# Patient Record
Sex: MALE | Race: OTHER | HISPANIC OR LATINO
[De-identification: names, ages, dates, MRNs, and addresses within clinical notes are randomized per-mention and may not be internally consistent; named-entity substitution may affect disease eponyms.]

---

## 2018-12-03 PROBLEM — Z00.00 ENCOUNTER FOR PREVENTIVE HEALTH EXAMINATION: Status: ACTIVE | Noted: 2018-12-03

## 2018-12-04 ENCOUNTER — APPOINTMENT (OUTPATIENT)
Dept: RADIOLOGY | Facility: CLINIC | Age: 63
End: 2018-12-04
Payer: SELF-PAY

## 2018-12-04 ENCOUNTER — OUTPATIENT (OUTPATIENT)
Dept: OUTPATIENT SERVICES | Facility: HOSPITAL | Age: 63
LOS: 1 days | End: 2018-12-04
Payer: MEDICARE

## 2018-12-04 DIAGNOSIS — R05 COUGH: ICD-10-CM

## 2018-12-04 PROCEDURE — 71046 X-RAY EXAM CHEST 2 VIEWS: CPT | Mod: 26

## 2018-12-04 PROCEDURE — 71046 X-RAY EXAM CHEST 2 VIEWS: CPT

## 2021-07-23 ENCOUNTER — OUTPATIENT (OUTPATIENT)
Dept: OUTPATIENT SERVICES | Facility: HOSPITAL | Age: 66
LOS: 1 days | End: 2021-07-23
Payer: MEDICARE

## 2021-07-23 ENCOUNTER — APPOINTMENT (OUTPATIENT)
Dept: ULTRASOUND IMAGING | Facility: CLINIC | Age: 66
End: 2021-07-23
Payer: MEDICARE

## 2021-07-23 DIAGNOSIS — Z00.8 ENCOUNTER FOR OTHER GENERAL EXAMINATION: ICD-10-CM

## 2021-07-23 PROCEDURE — 76775 US EXAM ABDO BACK WALL LIM: CPT

## 2021-07-23 PROCEDURE — 76775 US EXAM ABDO BACK WALL LIM: CPT | Mod: 26

## 2021-08-03 ENCOUNTER — APPOINTMENT (OUTPATIENT)
Dept: OTOLARYNGOLOGY | Facility: CLINIC | Age: 66
End: 2021-08-03
Payer: MEDICARE

## 2021-08-03 VITALS
SYSTOLIC BLOOD PRESSURE: 134 MMHG | BODY MASS INDEX: 24.48 KG/M2 | DIASTOLIC BLOOD PRESSURE: 86 MMHG | HEART RATE: 80 BPM | TEMPERATURE: 97.4 F | HEIGHT: 70 IN | WEIGHT: 171 LBS

## 2021-08-03 PROCEDURE — 99204 OFFICE O/P NEW MOD 45 MIN: CPT | Mod: 25

## 2021-08-03 PROCEDURE — 31231 NASAL ENDOSCOPY DX: CPT

## 2021-08-03 NOTE — HISTORY OF PRESENT ILLNESS
[de-identified] : Patient complains loss of taste and smell about a year ago, he saw  had sinus surgery 12/2020 and stents placed in his sinuses. He has hx of recurrent sinus infections, after surgery breathing improved and sinus infections resolved. He continues to have loss of taste and smell after surgery. He was treated with steroids and nasal sprays and it has not improved his sense of taste and smell. Pt has no ear pain, ear drainage, hearing loss, tinnitus, vertigo, epistaxis, throat pain, dysphagia or fevers\par \par

## 2021-08-03 NOTE — PROCEDURE
[FreeTextEntry6] : Nasal Endoscopy (93335)\par \par After informed verbal consent, nasal endoscopy was performed due to anterior rhinoscopy insufficient to account for symptoms. Flexible  scope #23 was used.  Topical vasoconstrictor and anesthetic was used.  \par \par The nasal endoscope is passed via the right nasal cavity. The inferior, middle, and superior turbinates responded to vasoconstrictors. The inferior, middle and superior meati were examined. The osteomeatal complex is clear with no polyposis or purulence. The sphenoethmoidal recess is clear with no polyposis or purulence. The choana is patent. \par \par The nasal endoscope is passed via the left nasal cavity. The inferior, middle, and superior turbinates responded to vasoconstrictors. The inferior, middle and superior meati were examined. The osteomeatal complex is clear with no polyposis or purulence. The sphenoethmoidal recess is clear with no polyposis or purulence. The choana is patent.  \par \par \par

## 2021-08-03 NOTE — ASSESSMENT
[FreeTextEntry1] : Patient status post sinus surgery had decreased sense of smell and taste prior surgery CAT scan was seen massive pansinusitis surgery improvement dramatically with a sense of smell and taste has not come back yet is here for second opinion CAT scan was reviewed showed that there is no evidence of any of frontal lobe tumors or masses endoscopically the sinuses that are visualized look clean I recommended that he try NeilMed sinus rinse with desonide as well as Flonase nasal spray after a course of a Medrol Dosepak and see if this will help him I also recommended he may want to follow-up with his ENT who did the surgery to repeat a CAT scan to see if there is any remnants of disease in certain areas that may need to be treated more aggressively.

## 2021-08-03 NOTE — END OF VISIT
[FreeTextEntry3] : I personally saw and examined [ ] in detail. I have spoken to Luli MAURICE regarding the assessment and plan of care. I reviewed the above assessment and plan of care, and agree. I have made changes in the body of the note where appropriate.\par

## 2023-01-25 DIAGNOSIS — I48.91 UNSPECIFIED ATRIAL FIBRILLATION: ICD-10-CM

## 2023-01-25 DIAGNOSIS — R43.0 ANOSMIA: ICD-10-CM

## 2023-01-25 DIAGNOSIS — Z87.891 PERSONAL HISTORY OF NICOTINE DEPENDENCE: ICD-10-CM

## 2023-01-25 DIAGNOSIS — R31.29 OTHER MICROSCOPIC HEMATURIA: ICD-10-CM

## 2023-01-25 DIAGNOSIS — Z82.49 FAMILY HISTORY OF ISCHEMIC HEART DISEASE AND OTHER DISEASES OF THE CIRCULATORY SYSTEM: ICD-10-CM

## 2023-01-25 DIAGNOSIS — Z78.9 OTHER SPECIFIED HEALTH STATUS: ICD-10-CM

## 2023-01-25 DIAGNOSIS — Z80.3 FAMILY HISTORY OF MALIGNANT NEOPLASM OF BREAST: ICD-10-CM

## 2023-01-25 DIAGNOSIS — J45.909 UNSPECIFIED ASTHMA, UNCOMPLICATED: ICD-10-CM

## 2023-01-25 RX ORDER — CHLORHEXIDINE GLUCONATE 4 %
1000 LIQUID (ML) TOPICAL DAILY
Refills: 0 | Status: ACTIVE | COMMUNITY
Start: 2023-01-25

## 2023-01-25 RX ORDER — METOPROLOL SUCCINATE 25 MG/1
25 TABLET, EXTENDED RELEASE ORAL
Qty: 30 | Refills: 0 | Status: ACTIVE | COMMUNITY
Start: 2023-01-25

## 2023-01-25 RX ORDER — COLD-HOT PACK
125 MCG EACH MISCELLANEOUS
Refills: 0 | Status: ACTIVE | COMMUNITY
Start: 2023-01-25

## 2023-01-25 RX ORDER — ALPRAZOLAM 0.25 MG/1
0.25 TABLET ORAL 3 TIMES DAILY
Refills: 0 | Status: ACTIVE | COMMUNITY
Start: 2023-01-25

## 2023-01-25 RX ORDER — FLUTICASONE PROPIONATE 50 UG/1
50 SPRAY, METERED NASAL
Qty: 1 | Refills: 5 | Status: DISCONTINUED | COMMUNITY
Start: 2021-08-03 | End: 2023-01-25

## 2023-01-25 RX ORDER — ALBUTEROL SULFATE 2.5 MG/3ML
(2.5 MG/3ML) SOLUTION RESPIRATORY (INHALATION)
Refills: 0 | Status: ACTIVE | COMMUNITY
Start: 2023-01-25

## 2023-01-25 RX ORDER — BUDESONIDE 1 MG/2ML
1 INHALANT ORAL
Qty: 1 | Refills: 5 | Status: DISCONTINUED | COMMUNITY
Start: 2021-08-03 | End: 2023-01-25

## 2023-01-25 RX ORDER — METHYLPREDNISOLONE 4 MG/1
4 TABLET ORAL
Qty: 1 | Refills: 0 | Status: DISCONTINUED | COMMUNITY
Start: 2021-08-03 | End: 2023-01-25

## 2023-01-25 RX ORDER — ALBUTEROL SULFATE 4 MG/1
TABLET ORAL
Refills: 0 | Status: DISCONTINUED | COMMUNITY
End: 2023-01-25

## 2023-01-25 RX ORDER — ZOLPIDEM TARTRATE 5 MG/1
5 TABLET, FILM COATED ORAL
Refills: 0 | Status: ACTIVE | COMMUNITY

## 2023-01-25 RX ORDER — DOXYCYCLINE HYCLATE 20 MG/1
20 TABLET ORAL
Refills: 0 | Status: DISCONTINUED | COMMUNITY
End: 2023-01-25

## 2023-01-25 RX ORDER — ASPIRIN 81 MG
81 TABLET, DELAYED RELEASE (ENTERIC COATED) ORAL
Refills: 0 | Status: DISCONTINUED | COMMUNITY
End: 2023-01-25

## 2023-01-25 RX ORDER — RIVAROXABAN 20 MG/1
20 TABLET, FILM COATED ORAL
Qty: 30 | Refills: 0 | Status: ACTIVE | COMMUNITY
Start: 2023-01-25

## 2023-01-26 ENCOUNTER — NON-APPOINTMENT (OUTPATIENT)
Age: 68
End: 2023-01-26

## 2023-01-26 ENCOUNTER — APPOINTMENT (OUTPATIENT)
Dept: ELECTROPHYSIOLOGY | Facility: CLINIC | Age: 68
End: 2023-01-26
Payer: MEDICARE

## 2023-01-26 VITALS
HEART RATE: 80 BPM | DIASTOLIC BLOOD PRESSURE: 70 MMHG | SYSTOLIC BLOOD PRESSURE: 122 MMHG | WEIGHT: 175 LBS | TEMPERATURE: 98.1 F | OXYGEN SATURATION: 96 % | HEIGHT: 70 IN | BODY MASS INDEX: 25.05 KG/M2

## 2023-01-26 PROCEDURE — 93000 ELECTROCARDIOGRAM COMPLETE: CPT

## 2023-01-26 PROCEDURE — 99205 OFFICE O/P NEW HI 60 MIN: CPT | Mod: 25

## 2023-01-26 RX ORDER — ASPIRIN ENTERIC COATED TABLETS 81 MG 81 MG/1
81 TABLET, DELAYED RELEASE ORAL
Qty: 90 | Refills: 3 | Status: DISCONTINUED | COMMUNITY
Start: 2023-01-25 | End: 2023-01-26

## 2023-01-26 NOTE — PHYSICAL EXAM
[Well Developed] : well developed [Well Nourished] : well nourished [No Acute Distress] : no acute distress [Normal Conjunctiva] : normal conjunctiva [Normal Venous Pressure] : normal venous pressure [Normal S1, S2] : normal S1, S2 [No Murmur] : no murmur [Clear Lung Fields] : clear lung fields [Good Air Entry] : good air entry [Soft] : abdomen soft [Normal Gait] : normal gait [No Edema] : no edema [No Cyanosis] : no cyanosis [No Clubbing] : no clubbing [No Rash] : no rash [Moves all extremities] : moves all extremities [No Focal Deficits] : no focal deficits [Alert and Oriented] : alert and oriented

## 2023-01-26 NOTE — REVIEW OF SYSTEMS
[SOB] : no shortness of breath [Dyspnea on exertion] : not dyspnea during exertion [Chest Discomfort] : no chest discomfort [Lower Ext Edema] : no extremity edema [Leg Claudication] : no intermittent leg claudication [Palpitations] : no palpitations [Syncope] : no syncope [Rash] : no rash [Dizziness] : no dizziness [Convulsions] : no convulsions [Easy Bleeding] : no tendency for easy bleeding [Negative] : Musculoskeletal

## 2023-01-26 NOTE — REASON FOR VISIT
[Arrhythmia/ECG Abnorrmalities] : arrhythmia/ECG abnormalities [Spouse] : spouse [FreeTextEntry3] : Dr Schwarz

## 2023-01-26 NOTE — HISTORY OF PRESENT ILLNESS
[FreeTextEntry1] : 67 year old gentleman with history of asthma, former smoker, presenting for evaluation paroxysmal atrial fibrillation.  \par \par He recently noted on his Fitbit watch to have suspected AF, but had no associated symptoms. His fit bit alerted him to multiple episodes of recurrent AF with rates  bpm, lasting up to an hour or so.  \par \par In office ECG 12/29/22 revealed sinus rhythm at 97 bpm. He then wore an event monitor from 12/30/22 to 1/13/23 and had an episode of atrial fibrillation lasting 5.5 hours with rates in AF  bpm (avg 116 bpm), and overall AF burden 2%. Otherwise in sinus rhythm his AVG HR was 72 bpm (range  bpm).  \par \par He was started on Xarelto 20mg qd and Toprol 25mg qd. He is tolerating medical therapy and denies bleeding issues.  \par \par He denies any palpitation, chest pain, dyspnea or syncope. He is able to walk frequently and go bowling without limitations.  \par \par He does drink beer particularly on weekends. He has poor sleep but denies known sleep apnea.  \par \par He denies history of HTN, CHF, DM, CAD, stroke.  \par \par He rosales shave a family history of aortic valve disease including his mother with aortic aneursym and two syblings with aortic disection. He has recently undergone workup with abdominal ultrasound.,  \par \par Recent stress test 1/24/23 revealed no ischemia or exercise induced arrhythmia. HR increased from 74 to 163 bpm (8METS).  \par \par A TTE is pending.  \par

## 2023-01-26 NOTE — CARDIOLOGY SUMMARY
[de-identified] : 1/26/23 sinus rhythm 67 bpm, narrow QRS [de-identified] : ETT 1/24/23 revealed no ischemia or exercise induced arrhythmia. HR increased from 74 to 163 bpm (8METS).   \par \par ETT 4/6/2016 negative for ischemia or induced arrhythmia. HR 7-0- 162 bpm with exercise (102% predicted)  [de-identified] : Carotid 7/21 no significant carotid stenosis

## 2023-01-26 NOTE — DISCUSSION/SUMMARY
[FreeTextEntry1] : 67 year old gentleman with history of asthma, former smoker, presenting for evaluation paroxysmal atrial fibrillation. He has had episodes of paroxysmal atrial fibrillation with minimal associated symptoms at this point. Recent Event monitor noted a 2% AF burden with episodes lasting over 5 hours with rapid rates. We discussed AF in detail, associated risks including risk of progression to persistent AF and risk of CHF and stroke. We reviewed management options, and typical indications for rhythm control including symptoms. Though he is not highly symptomatic, he is concerned about progression of this arrhythmia and expressed interest in an early rhythm control strategy to reduce risk of progression and long-term AF-related morbidity; we did discuss recent evidence that supports an early rhythm control strategy for this purpose. He is hopeful to avoid additional long-term medical therapy, and would like to proceed with AF ablation. We discussed the risks and benefits of AF ablation in detail, including procedure related risks such as bleeding, vascular injury, cardiac perforation, stroke and esophageal injury. He expressed understanding, and does want to proceed later this year.  \par \par -AF ablation. Possible ORESTES if interruption in anticoagulation.  \par \par -continue Xarelto through day prior to the procedure.  \par \par -CHADSVASc=1, will continue anticoagulation se-procedurally, and reevaluate long-term risks and benefits of anticoagulation pending followup after ablation.  \par \par -continue metoprolol for now as tolerated.  \par \par -continue Fitbit for arrhythmia monitoring at this time.   [EKG obtained to assist in diagnosis and management of assessed problem(s)] : EKG obtained to assist in diagnosis and management of assessed problem(s)

## 2023-03-22 ENCOUNTER — APPOINTMENT (OUTPATIENT)
Dept: CT IMAGING | Facility: CLINIC | Age: 68
End: 2023-03-22
Payer: MEDICARE

## 2023-03-22 PROCEDURE — 74177 CT ABD & PELVIS W/CONTRAST: CPT

## 2023-05-02 ENCOUNTER — OUTPATIENT (OUTPATIENT)
Dept: OUTPATIENT SERVICES | Facility: HOSPITAL | Age: 68
LOS: 1 days | End: 2023-05-02
Payer: MEDICARE

## 2023-05-02 VITALS
HEART RATE: 88 BPM | HEIGHT: 70 IN | TEMPERATURE: 98 F | OXYGEN SATURATION: 98 % | SYSTOLIC BLOOD PRESSURE: 100 MMHG | WEIGHT: 174.39 LBS | RESPIRATION RATE: 16 BRPM | DIASTOLIC BLOOD PRESSURE: 78 MMHG

## 2023-05-02 DIAGNOSIS — Z98.890 OTHER SPECIFIED POSTPROCEDURAL STATES: Chronic | ICD-10-CM

## 2023-05-02 DIAGNOSIS — I10 ESSENTIAL (PRIMARY) HYPERTENSION: ICD-10-CM

## 2023-05-02 DIAGNOSIS — Z87.09 PERSONAL HISTORY OF OTHER DISEASES OF THE RESPIRATORY SYSTEM: ICD-10-CM

## 2023-05-02 DIAGNOSIS — Z01.818 ENCOUNTER FOR OTHER PREPROCEDURAL EXAMINATION: ICD-10-CM

## 2023-05-02 DIAGNOSIS — I48.0 PAROXYSMAL ATRIAL FIBRILLATION: ICD-10-CM

## 2023-05-02 DIAGNOSIS — Z29.9 ENCOUNTER FOR PROPHYLACTIC MEASURES, UNSPECIFIED: ICD-10-CM

## 2023-05-02 DIAGNOSIS — I48.91 UNSPECIFIED ATRIAL FIBRILLATION: ICD-10-CM

## 2023-05-02 LAB
A1C WITH ESTIMATED AVERAGE GLUCOSE RESULT: 5.2 % — SIGNIFICANT CHANGE UP (ref 4–5.6)
ALBUMIN SERPL ELPH-MCNC: 4.3 G/DL — SIGNIFICANT CHANGE UP (ref 3.3–5.2)
ALP SERPL-CCNC: 86 U/L — SIGNIFICANT CHANGE UP (ref 40–120)
ALT FLD-CCNC: 15 U/L — SIGNIFICANT CHANGE UP
ANION GAP SERPL CALC-SCNC: 12 MMOL/L — SIGNIFICANT CHANGE UP (ref 5–17)
APTT BLD: 33.5 SEC — SIGNIFICANT CHANGE UP (ref 27.5–35.5)
AST SERPL-CCNC: 21 U/L — SIGNIFICANT CHANGE UP
BASOPHILS # BLD AUTO: 0.05 K/UL — SIGNIFICANT CHANGE UP (ref 0–0.2)
BASOPHILS NFR BLD AUTO: 0.9 % — SIGNIFICANT CHANGE UP (ref 0–2)
BILIRUB SERPL-MCNC: 0.4 MG/DL — SIGNIFICANT CHANGE UP (ref 0.4–2)
BLD GP AB SCN SERPL QL: SIGNIFICANT CHANGE UP
BUN SERPL-MCNC: 15.1 MG/DL — SIGNIFICANT CHANGE UP (ref 8–20)
CALCIUM SERPL-MCNC: 9.4 MG/DL — SIGNIFICANT CHANGE UP (ref 8.4–10.5)
CHLORIDE SERPL-SCNC: 103 MMOL/L — SIGNIFICANT CHANGE UP (ref 96–108)
CO2 SERPL-SCNC: 28 MMOL/L — SIGNIFICANT CHANGE UP (ref 22–29)
CREAT SERPL-MCNC: 0.75 MG/DL — SIGNIFICANT CHANGE UP (ref 0.5–1.3)
EGFR: 97 ML/MIN/1.73M2 — SIGNIFICANT CHANGE UP
EOSINOPHIL # BLD AUTO: 0.36 K/UL — SIGNIFICANT CHANGE UP (ref 0–0.5)
EOSINOPHIL NFR BLD AUTO: 6.4 % — HIGH (ref 0–6)
ESTIMATED AVERAGE GLUCOSE: 103 MG/DL — SIGNIFICANT CHANGE UP (ref 68–114)
GLUCOSE SERPL-MCNC: 96 MG/DL — SIGNIFICANT CHANGE UP (ref 70–99)
HCT VFR BLD CALC: 42.8 % — SIGNIFICANT CHANGE UP (ref 39–50)
HGB BLD-MCNC: 14.2 G/DL — SIGNIFICANT CHANGE UP (ref 13–17)
IMM GRANULOCYTES NFR BLD AUTO: 0.4 % — SIGNIFICANT CHANGE UP (ref 0–0.9)
INR BLD: 1.21 RATIO — HIGH (ref 0.88–1.16)
LYMPHOCYTES # BLD AUTO: 1.33 K/UL — SIGNIFICANT CHANGE UP (ref 1–3.3)
LYMPHOCYTES # BLD AUTO: 23.6 % — SIGNIFICANT CHANGE UP (ref 13–44)
MAGNESIUM SERPL-MCNC: 2 MG/DL — SIGNIFICANT CHANGE UP (ref 1.6–2.6)
MCHC RBC-ENTMCNC: 31.1 PG — SIGNIFICANT CHANGE UP (ref 27–34)
MCHC RBC-ENTMCNC: 33.2 GM/DL — SIGNIFICANT CHANGE UP (ref 32–36)
MCV RBC AUTO: 93.7 FL — SIGNIFICANT CHANGE UP (ref 80–100)
MONOCYTES # BLD AUTO: 0.55 K/UL — SIGNIFICANT CHANGE UP (ref 0–0.9)
MONOCYTES NFR BLD AUTO: 9.8 % — SIGNIFICANT CHANGE UP (ref 2–14)
NEUTROPHILS # BLD AUTO: 3.32 K/UL — SIGNIFICANT CHANGE UP (ref 1.8–7.4)
NEUTROPHILS NFR BLD AUTO: 58.9 % — SIGNIFICANT CHANGE UP (ref 43–77)
PLATELET # BLD AUTO: 283 K/UL — SIGNIFICANT CHANGE UP (ref 150–400)
POTASSIUM SERPL-MCNC: 4.3 MMOL/L — SIGNIFICANT CHANGE UP (ref 3.5–5.3)
POTASSIUM SERPL-SCNC: 4.3 MMOL/L — SIGNIFICANT CHANGE UP (ref 3.5–5.3)
PROT SERPL-MCNC: 7.7 G/DL — SIGNIFICANT CHANGE UP (ref 6.6–8.7)
PROTHROM AB SERPL-ACNC: 14.1 SEC — HIGH (ref 10.5–13.4)
RBC # BLD: 4.57 M/UL — SIGNIFICANT CHANGE UP (ref 4.2–5.8)
RBC # FLD: 13 % — SIGNIFICANT CHANGE UP (ref 10.3–14.5)
SODIUM SERPL-SCNC: 142 MMOL/L — SIGNIFICANT CHANGE UP (ref 135–145)
WBC # BLD: 5.63 K/UL — SIGNIFICANT CHANGE UP (ref 3.8–10.5)
WBC # FLD AUTO: 5.63 K/UL — SIGNIFICANT CHANGE UP (ref 3.8–10.5)

## 2023-05-02 PROCEDURE — 93010 ELECTROCARDIOGRAM REPORT: CPT

## 2023-05-02 PROCEDURE — G0463: CPT

## 2023-05-02 PROCEDURE — 93005 ELECTROCARDIOGRAM TRACING: CPT

## 2023-05-02 NOTE — H&P PST ADULT - PROBLEM SELECTOR PLAN 2
Total Score [        ]    Caprini Score 0-2: Low Risk, NO VTE prophylaxis required for most patients, encourage ambulation  Caprini Score 3-6: Moderate Risk , pharmacologic VTE prophylaxis is indicated for most patients (in the absence of contraindications)  Caprini Score Greater than or =7: High risk, pharmocologic VTE prophylaxis indicated for most patients (in the absence of contraindications) May continue albuterol as needed  Aware to bring inhaler day of procedure  Written & verbal instructions provided to pt for all education/instructions, questions encouraged/addressed, pt verbalized understandings of all education/instructions, teach back method utilized

## 2023-05-02 NOTE — H&P PST ADULT - IF HCP IS NOT ON CHART, IDENTIFY THE PERSONS NAME AND PHONE NUMBER CONTACTED TO BRING IN DOCUMENT
does not have, educated can copy or bring original for DOS, receptive, aware can fill out new one also

## 2023-05-02 NOTE — H&P PST ADULT - PROBLEM SELECTOR PLAN 1
EKG, labs performed, results pending  Scheduled for ORESTES / Afib Ablation / Carto / Anes on 5.9.2023 with Dr. Shaver.   Pt instructed to continue Xarelto through day prior to procedure: LAST DOSE 5.8.2023 - verbal/written instructions provided, aware no A/C day of procedure  Patient educated on surgical scrub, COVID testing, preadmission instructions, medical clearance and day of procedure medications, verbalizes understanding.   Pt instructed to stop vitamins/supplements/herbal medications/ASA/NSAIDS for one week prior to surgery and discuss with PMD.   Written & verbal instructions provided to pt for all education/instructions, questions encouraged/addressed, pt verbalized understandings of all education/instructions, teach back method utilized

## 2023-05-02 NOTE — H&P PST ADULT - NEGATIVE OPHTHALMOLOGIC SYMPTOMS
wears glasses, denies contact use/no diplopia/no photophobia/no lacrimation L/no lacrimation R/no blurred vision L/no blurred vision R/no discharge L/no discharge R/no pain L/no pain R/no irritation L

## 2023-05-02 NOTE — H&P PST ADULT - ASSESSMENT
CAPRINI SCORE    AGE RELATED RISK FACTORS                                                             [ ] Age 41-60 years                                            (1 Point)  [ ] Age: 61-74 years                                           (2 Points)                 [ ] Age= 75 years                                                (3 Points)             DISEASE RELATED RISK FACTORS                                                       [ ] Edema in the lower extremities                 (1 Point)                     [ ] Varicose veins                                               (1 Point)                                 [ ] BMI > 25 Kg/m2                                            (1 Point)                                  [ ] Serious infection (ie PNA)                            (1 Point)                     [ ] Lung disease ( COPD, Emphysema)            (1 Point)                                                                          [ ] Acute myocardial infarction                         (1 Point)                  [ ] Congestive heart failure (in the previous month)  (1 Point)         [ ] Inflammatory bowel disease                            (1 Point)                  [ ] Central venous access, PICC or Port               (2 points)       (within the last month)                                                                [ ] Stroke (in the previous month)                        (5 Points)    [ ] Previous or present malignancy                       (2 points)                                                                                                                                                         HEMATOLOGY RELATED FACTORS                                                         [ ] Prior episodes of VTE                                     (3 Points)                     [ ] Positive family history for VTE                      (3 Points)                  [ ] Prothrombin 62280 A                                     (3 Points)                     [ ] Factor V Leiden                                                (3 Points)                        [ ] Lupus anticoagulants                                      (3 Points)                                                           [ ] Anticardiolipin antibodies                              (3 Points)                                                       [ ] High homocysteine in the blood                   (3 Points)                                             [ ] Other congenital or acquired thrombophilia      (3 Points)                                                [ ] Heparin induced thrombocytopenia                  (3 Points)                                        MOBILITY RELATED FACTORS  [ ] Bed rest                                                         (1 Point)  [ ] Plaster cast                                                    (2 points)  [ ] Bed bound for more than 72 hours           (2 Points)    GENDER SPECIFIC FACTORS  [ ] Pregnancy or had a baby within the last month   (1 Point)  [ ] Post-partum < 6 weeks                                   (1 Point)  [ ] Hormonal therapy  or oral contraception   (1 Point)  [ ] History of pregnancy complications              (1 point)  [ ] Unexplained or recurrent              (1 Point)    OTHER RISK FACTORS                                           (1 Point)  [ ] BMI >40, smoking, diabetes requiring insulin, chemotherapy  blood transfusions and length of surgery over 2 hours    SURGERY RELATED RISK FACTORS  [ ]  Section within the last month     (1 Point)  [ ] Minor surgery                                                  (1 Point)  [ ] Arthroscopic surgery                                       (2 Points)  [ ] Planned major surgery lasting more            (2 Points)      than 45 minutes     [ ] Elective hip or knee joint replacement       (5 points)       surgery                                                TRAUMA RELATED RISK FACTORS  [ ] Fracture of the hip, pelvis, or leg                       (5 Points)  [ ] Spinal cord injury resulting in paralysis             (5 points)       (in the previous month)    [ ] Paralysis  (less than 1 month)                             (5 Points)  [ ] Multiple Trauma within 1 month                        (5 Points)    Total Score [        ]    Caprini Score 0-2: Low Risk, NO VTE prophylaxis required for most patients, encourage ambulation  Caprini Score 3-6: Moderate Risk , pharmacologic VTE prophylaxis is indicated for most patients (in the absence of contraindications)  Caprini Score Greater than or =7: High risk, pharmocologic VTE prophylaxis indicated for most patients (in the absence of contraindications)    OPIOID RISK TOOL    SARAH EACH BOX THAT APPLIES AND ADD TOTALS AT THE END    FAMILY HISTORY OF SUBSTANCE ABUSE                 FEMALE         MALE                                                Alcohol                             [  ]1 pt          [  ]3pts                                               Illegal Durgs                     [  ]2 pts        [  ]3pts                                               Rx Drugs                           [  ]4 pts        [  ]4 pts    PERSONAL HISTORY OF SUBSTANCE ABUSE                                                                                          Alcohol                             [  ]3 pts       [  ]3 pts                                               Illegal Drugs                     [  ]4 pts        [  ]4 pts                                               Rx Drugs                           [  ]5 pts        [  ]5 pts    AGE BETWEEN 16-45 YEARS                                      [  ]1 pt         [  ]1 pt    HISTORY OF PREADOLESCENT   SEXUAL ABUSE                                                             [  ]3 pts        [  ]0pts    PSYCHOLOGICAL DISEASE                     ADD, OCD, Bipolar, Schizophrenia        [  ]2 pts         [  ]2 pts                      Depression                                               [  ]1 pt           [  ]1 pt           SCORING TOTAL 0    A score of 3 or lower indicated LOW risk for future opioid abuse  A score of 4 to 7 indicated moderate risk for future opioid abuse  A score of 8 or higher indicates a high risk for opioid abuse                               Pt is a very pleasant 69 y/o male with history of mild intermittent asthma, HTN, rosacea and asymptomatic paroxysmal atrial fibrillation.  Martín explains noticing an elevated heart rate on his fitbit watch, denies ever having associated symptoms, f/u with his PCP, referred to cardiology.  Monitoring revealed an episode of afib lasting 5.5 hrs, HR ranging  BPM, Martín verbalizes wishes for intervention.  Pt continues to deny palpitations, SOB, chest pain, wheezing, LE edema, syncopal episodes, numbness/tingling/weakness, visual disturbances, reports feeling well.  Patient now presents in anticipation of elective ORESTES / Afib Ablation / Carto / Anes on 2023 with Dr. Shaver.     CAPRINI SCORE    AGE RELATED RISK FACTORS                                                             [ ] Age 41-60 years                                            (1 Point)  [X ] Age: 61-74 years                                           (2 Points)                 [ ] Age= 75 years                                                (3 Points)             DISEASE RELATED RISK FACTORS                                                       [ ] Edema in the lower extremities                 (1 Point)                     [ ] Varicose veins                                               (1 Point)                                 [ ] BMI > 25 Kg/m2                                            (1 Point)                                  [ ] Serious infection (ie PNA)                            (1 Point)                     [ ] Lung disease ( COPD, Emphysema)            (1 Point)                                                                          [ ] Acute myocardial infarction                         (1 Point)                  [ ] Congestive heart failure (in the previous month)  (1 Point)         [ ] Inflammatory bowel disease                            (1 Point)                  [ ] Central venous access, PICC or Port               (2 points)       (within the last month)                                                                [ ] Stroke (in the previous month)                        (5 Points)    [ ] Previous or present malignancy                       (2 points)                                                                                                                                                         HEMATOLOGY RELATED FACTORS                                                         [ ] Prior episodes of VTE                                     (3 Points)                     [ ] Positive family history for VTE                      (3 Points)                  [ ] Prothrombin 36333 A                                     (3 Points)                     [ ] Factor V Leiden                                                (3 Points)                        [ ] Lupus anticoagulants                                      (3 Points)                                                           [ ] Anticardiolipin antibodies                              (3 Points)                                                       [ ] High homocysteine in the blood                   (3 Points)                                             [ ] Other congenital or acquired thrombophilia      (3 Points)                                                [ ] Heparin induced thrombocytopenia                  (3 Points)                                        MOBILITY RELATED FACTORS  [ ] Bed rest                                                         (1 Point)  [ ] Plaster cast                                                    (2 points)  [ ] Bed bound for more than 72 hours           (2 Points)    GENDER SPECIFIC FACTORS  [ ] Pregnancy or had a baby within the last month   (1 Point)  [ ] Post-partum < 6 weeks                                   (1 Point)  [ ] Hormonal therapy  or oral contraception   (1 Point)  [ ] History of pregnancy complications              (1 point)  [ ] Unexplained or recurrent              (1 Point)    OTHER RISK FACTORS                                           (1 Point)  [ ] BMI >40, smoking, diabetes requiring insulin, chemotherapy  blood transfusions and length of surgery over 2 hours    SURGERY RELATED RISK FACTORS  [ ]  Section within the last month     (1 Point)  [ ] Minor surgery                                                  (1 Point)  [ ] Arthroscopic surgery                                       (2 Points)  [ X] Planned major surgery lasting more            (2 Points)      than 45 minutes     [ ] Elective hip or knee joint replacement       (5 points)       surgery                                                TRAUMA RELATED RISK FACTORS  [ ] Fracture of the hip, pelvis, or leg                       (5 Points)  [ ] Spinal cord injury resulting in paralysis             (5 points)       (in the previous month)    [ ] Paralysis  (less than 1 month)                             (5 Points)  [ ] Multiple Trauma within 1 month                        (5 Points)    Total Score [    3    ]    Caprini Score 0-2: Low Risk, NO VTE prophylaxis required for most patients, encourage ambulation  Caprini Score 3-6: Moderate Risk , pharmacologic VTE prophylaxis is indicated for most patients (in the absence of contraindications)  Caprini Score Greater than or =7: High risk, pharmocologic VTE prophylaxis indicated for most patients (in the absence of contraindications)    OPIOID RISK TOOL    SARAH EACH BOX THAT APPLIES AND ADD TOTALS AT THE END    FAMILY HISTORY OF SUBSTANCE ABUSE                 FEMALE         MALE                                                Alcohol                             [  ]1 pt          [  ]3pts                                               Illegal Durgs                     [  ]2 pts        [  ]3pts                                               Rx Drugs                           [  ]4 pts        [  ]4 pts    PERSONAL HISTORY OF SUBSTANCE ABUSE                                                                                          Alcohol                             [  ]3 pts       [  ]3 pts                                               Illegal Drugs                     [  ]4 pts        [  ]4 pts                                               Rx Drugs                           [  ]5 pts        [  ]5 pts    AGE BETWEEN 16-45 YEARS                                      [  ]1 pt         [  ]1 pt    HISTORY OF PREADOLESCENT   SEXUAL ABUSE                                                             [  ]3 pts        [  ]0pts    PSYCHOLOGICAL DISEASE                     ADD, OCD, Bipolar, Schizophrenia        [  ]2 pts         [  ]2 pts                      Depression                                               [  ]1 pt           [  ]1 pt           SCORING TOTAL 0    A score of 3 or lower indicated LOW risk for future opioid abuse  A score of 4 to 7 indicated moderate risk for future opioid abuse  A score of 8 or higher indicates a high risk for opioid abuse

## 2023-05-02 NOTE — H&P PST ADULT - PROBLEM SELECTOR PLAN 4
Total Score [    3    ]    Caprini Score 3-6: Moderate Risk , pharmacologic VTE prophylaxis is indicated for most patients (in the absence of contraindications)

## 2023-05-02 NOTE — H&P PST ADULT - NSICDXPASTSURGICALHX_GEN_ALL_CORE_FT
PAST SURGICAL HISTORY:  S/P bunionectomy      PAST SURGICAL HISTORY:  H/O sinus surgery     S/P bunionectomy

## 2023-05-02 NOTE — H&P PST ADULT - HISTORY OF PRESENT ILLNESS
Pt is a pleasant 68y Male with history of asthma, and symptomatic **paroxysmal or persistent** atrial fibrillation. **add additional description as applicable**    Patient now presents in anticipation of elective ORESTES / Afib Ablation / Carto / Anes on 5.9.2023 with Dr. Shaver.     He recently noted on his Fitbit watch to have suspected AF, but had no associated symptoms. His fit bit alerted him to multiple episodes of recurrent AF with rates  bpm, lasting up to an hour or so.      He then wore an event monitor from 12/30/22 to 1/13/23 and had an episode of atrial fibrillation lasting 5.5 hours with rates in AF  bpm (avg 116 bpm), and overall AF burden 2%. Otherwise in sinus rhythm his AVG HR was 72 bpm (range  bpm).     Echocardiogram (date):   Stress Test (date): ETT 1/24/23 revealed no ischemia or exercise induced arrhythmia. HR increased from 74 to 163 bpm (8METS).                               4/6/2016 negative for ischemia or induced arrhythmia. HR 7-0- 162 bpm with exercise (102% predicted)   Cardiac CT or MRI (date): unable to find  Cardiac Cath (date): n/a  Cardiac surgery (date): n/a  Carotid/Aorta/Peripheral Vascular: Carotid 7/21 no significant carotid stenosis    Pt is a pleasant 68y Male with history of asthma, and symptomatic **paroxysmal or persistent** atrial fibrillation. **add additional description as applicable**    denies palpitations etc.    Patient now presents in anticipation of elective ORESTES / Afib Ablation / Carto / Anes on 5.9.2023 with Dr. Shaver.     He recently noted on his Fitbit watch to have suspected AF, but had no associated symptoms. His fit bit alerted him to multiple episodes of recurrent AF with rates  bpm, lasting up to an hour or so.      He then wore an event monitor from 12/30/22 to 1/13/23 and had an episode of atrial fibrillation lasting 5.5 hours with rates in AF  bpm (avg 116 bpm), and overall AF burden 2%. Otherwise in sinus rhythm his AVG HR was 72 bpm (range  bpm).     noticed on fitbit,  f/u with PCP, referred to Dr. Shaver    Echocardiogram (date):   Stress Test (date): ETT 1/24/23 revealed no ischemia or exercise induced arrhythmia. HR increased from 74 to 163 bpm (8METS).                               4/6/2016 negative for ischemia or induced arrhythmia. HR 7-0- 162 bpm with exercise (102% predicted)   Cardiac CT or MRI (date): unable to find  Cardiac Cath (date): n/a  Cardiac surgery (date): n/a  Carotid/Aorta/Peripheral Vascular: Carotid 7/21 no significant carotid stenosis    Pt is a very pleasant 69 y/o male with history of mild intermittent asthma, HTN, rosacea and asymptomatic paroxysmal atrial fibrillation.  Martín explains noticing an elevated heart rate on his fitbit watch, denies ever having associated symptoms, f/u with his PCP, referred to cardiology.  Monitoring revealed an episode of afib lasting 5.5 hrs, HR ranging  BPM, Martín verbalizes wishes for intervention.  Pt continues to deny palpitations, SOB, chest pain, wheezing, LE edema, syncopal episodes, numbness/tingling/weakness, visual disturbances, reports feeling well.  Patient now presents in anticipation of elective ORESTES / Afib Ablation / Carto / Anes on 5.9.2023 with Dr. Shaver.     Echocardiogram (date):   Stress Test (date): ETT 1/24/23 revealed no ischemia or exercise induced arrhythmia. HR increased from 74 to 163 bpm (8METS).                               4/6/2016 negative for ischemia or induced arrhythmia. HR 7-0- 162 bpm with exercise (102% predicted)   Cardiac CT or MRI (date): unable to find  Cardiac Cath (date): n/a  Cardiac surgery (date): n/a  Carotid/Aorta/Peripheral Vascular: Carotid 7/21 no significant carotid stenosis

## 2023-05-02 NOTE — H&P PST ADULT - MUSCULOSKELETAL
no calf tenderness/erythema/edema/normal/ROM intact/no calf tenderness/normal gait/strength 5/5 bilateral upper extremities/strength 5/5 bilateral lower extremities negative

## 2023-05-02 NOTE — H&P PST ADULT - NSANTHOSAYNRD_GEN_A_CORE
No. SILVIA screening performed.  STOP BANG Legend: 0-2 = LOW Risk; 3-4 = INTERMEDIATE Risk; 5-8 = HIGH Risk

## 2023-05-02 NOTE — H&P PST ADULT - PROBLEM SELECTOR PLAN 3
EKG, labs completed today   BP: 100/78  Aware to continue metoprolol as prescribed and to take morning of procedure with small sip of water  Written & verbal instructions provided to pt for all education/instructions, questions encouraged/addressed, pt verbalized understandings of all education/instructions, teach back method utilized

## 2023-05-02 NOTE — H&P PST ADULT - NEGATIVE CARDIOVASCULAR SYMPTOMS
See HPI/no chest pain/no palpitations/no dyspnea on exertion/no orthopnea/no paroxysmal nocturnal dyspnea/no peripheral edema/no claudication

## 2023-05-02 NOTE — H&P PST ADULT - NSICDXPASTMEDICALHX_GEN_ALL_CORE_FT
PAST MEDICAL HISTORY:  Afib     History of asthma      PAST MEDICAL HISTORY:  Afib     History of asthma     HTN (hypertension)     Rosacea

## 2023-05-02 NOTE — H&P PST ADULT - NSICDXFAMILYHX_GEN_ALL_CORE_FT
FAMILY HISTORY:  Mother  Still living? Unknown  FH: asthma, Age at diagnosis: Age Unknown  FH: breast cancer, Age at diagnosis: Age Unknown  FH: heart disease, Age at diagnosis: Age Unknown

## 2023-05-02 NOTE — H&P PST ADULT - NEUROLOGICAL
normal/sensation intact/responds to verbal commands/cranial nerves intact/no spontaneous movement negative

## 2023-05-09 ENCOUNTER — TRANSCRIPTION ENCOUNTER (OUTPATIENT)
Age: 68
End: 2023-05-09

## 2023-05-09 ENCOUNTER — INPATIENT (INPATIENT)
Facility: HOSPITAL | Age: 68
LOS: 0 days | Discharge: HOME | DRG: 274 | End: 2023-05-10
Attending: STUDENT IN AN ORGANIZED HEALTH CARE EDUCATION/TRAINING PROGRAM | Admitting: STUDENT IN AN ORGANIZED HEALTH CARE EDUCATION/TRAINING PROGRAM
Payer: MEDICARE

## 2023-05-09 VITALS
HEIGHT: 70 IN | RESPIRATION RATE: 17 BRPM | OXYGEN SATURATION: 95 % | HEART RATE: 69 BPM | TEMPERATURE: 98 F | WEIGHT: 171.08 LBS | DIASTOLIC BLOOD PRESSURE: 76 MMHG | SYSTOLIC BLOOD PRESSURE: 144 MMHG

## 2023-05-09 DIAGNOSIS — Z98.890 OTHER SPECIFIED POSTPROCEDURAL STATES: Chronic | ICD-10-CM

## 2023-05-09 DIAGNOSIS — I48.0 PAROXYSMAL ATRIAL FIBRILLATION: ICD-10-CM

## 2023-05-09 LAB — ABO RH CONFIRMATION: SIGNIFICANT CHANGE UP

## 2023-05-09 PROCEDURE — 93656 COMPRE EP EVAL ABLTJ ATR FIB: CPT

## 2023-05-09 PROCEDURE — 93657 TX L/R ATRIAL FIB ADDL: CPT

## 2023-05-09 RX ORDER — ALBUTEROL 90 UG/1
2 AEROSOL, METERED ORAL EVERY 6 HOURS
Refills: 0 | Status: DISCONTINUED | OUTPATIENT
Start: 2023-05-09 | End: 2023-05-10

## 2023-05-09 RX ORDER — ZOLPIDEM TARTRATE 10 MG/1
2.5 TABLET ORAL AT BEDTIME
Refills: 0 | Status: DISCONTINUED | OUTPATIENT
Start: 2023-05-09 | End: 2023-05-10

## 2023-05-09 RX ORDER — FUROSEMIDE 40 MG
20 TABLET ORAL ONCE
Refills: 0 | Status: COMPLETED | OUTPATIENT
Start: 2023-05-09 | End: 2023-05-09

## 2023-05-09 RX ORDER — METOPROLOL TARTRATE 50 MG
25 TABLET ORAL DAILY
Refills: 0 | Status: DISCONTINUED | OUTPATIENT
Start: 2023-05-09 | End: 2023-05-10

## 2023-05-09 RX ORDER — RIVAROXABAN 15 MG-20MG
1 KIT ORAL
Refills: 0 | DISCHARGE

## 2023-05-09 RX ORDER — ZOLPIDEM TARTRATE 10 MG/1
1 TABLET ORAL
Refills: 0 | DISCHARGE

## 2023-05-09 RX ORDER — BENZOCAINE AND MENTHOL 5; 1 G/100ML; G/100ML
1 LIQUID ORAL
Refills: 0 | Status: DISCONTINUED | OUTPATIENT
Start: 2023-05-09 | End: 2023-05-10

## 2023-05-09 RX ORDER — ALBUTEROL 90 UG/1
2 AEROSOL, METERED ORAL
Refills: 0 | DISCHARGE

## 2023-05-09 RX ORDER — RIVAROXABAN 15 MG-20MG
20 KIT ORAL
Refills: 0 | Status: DISCONTINUED | OUTPATIENT
Start: 2023-05-09 | End: 2023-05-10

## 2023-05-09 RX ORDER — PANTOPRAZOLE SODIUM 20 MG/1
40 TABLET, DELAYED RELEASE ORAL
Refills: 0 | Status: DISCONTINUED | OUTPATIENT
Start: 2023-05-09 | End: 2023-05-10

## 2023-05-09 RX ORDER — METOPROLOL TARTRATE 50 MG
1 TABLET ORAL
Refills: 0 | DISCHARGE

## 2023-05-09 RX ORDER — SUCRALFATE 1 G
1 TABLET ORAL
Refills: 0 | Status: DISCONTINUED | OUTPATIENT
Start: 2023-05-09 | End: 2023-05-10

## 2023-05-09 RX ORDER — ACETAMINOPHEN, CHLORPHENIRAMINE MALEATE, AND PHENYLEPHRINE HYDROCHLORIDE 325; 2; 5 MG/1; MG/1; MG/1
1 TABLET, COATED ORAL
Refills: 0 | DISCHARGE

## 2023-05-09 RX ORDER — ACETAMINOPHEN 500 MG
650 TABLET ORAL EVERY 6 HOURS
Refills: 0 | Status: DISCONTINUED | OUTPATIENT
Start: 2023-05-09 | End: 2023-05-10

## 2023-05-09 RX ADMIN — Medication 1 GRAM(S): at 18:18

## 2023-05-09 RX ADMIN — RIVAROXABAN 20 MILLIGRAM(S): KIT at 18:18

## 2023-05-09 RX ADMIN — ZOLPIDEM TARTRATE 2.5 MILLIGRAM(S): 10 TABLET ORAL at 21:35

## 2023-05-09 RX ADMIN — PANTOPRAZOLE SODIUM 40 MILLIGRAM(S): 20 TABLET, DELAYED RELEASE ORAL at 18:18

## 2023-05-09 RX ADMIN — Medication 20 MILLIGRAM(S): at 18:18

## 2023-05-09 NOTE — DISCHARGE NOTE PROVIDER - HOSPITAL COURSE
68 year old male, former smoker, with HTN, rosacea, asthma, and paroxysmal atrial fibrillation. He presented electively and is now status post uncomplicated radiofrequency ablation of atrial fibrillation (WACA/PVI, CTI). 68 year old male, former smoker, with HTN, rosacea, asthma, and paroxysmal atrial fibrillation. He presented electively and is now status post uncomplicated radiofrequency ablation of atrial fibrillation (WACA/PVI, CTI).   The patient was observed overnight without event and was discharged home the following morning with a plan for outpatient follow up.

## 2023-05-09 NOTE — PROGRESS NOTE ADULT - SUBJECTIVE AND OBJECTIVE BOX
68 year old male, former smoker, with HTN, rosacea, asthma, and paroxysmal atrial fibrillation. He presents today for elective radiofrequency ablation of atrial fibrillation with preceding ORESTES.     Confirms NPO > 8 hrs, last dose Xarelto 5/8/23 PM.     - iv heplock  - confirmatory type and screen  - 2 units prbc on hold  - consent anil THOMPSON        68 year old male, former smoker, with HTN, rosacea, asthma, and paroxysmal atrial fibrillation. He presents today for elective radiofrequency ablation of atrial fibrillation.   Telemetry shows sinus rhythm today.     Confirms NPO > 8 hrs, last dose Xarelto 5/8/23 PM.   ORESTES deferred as pt is in sinus rhythm, and reports strict compliance with AC.     - iv heplock  - confirmatory type and screen  - 2 units prbc on hold  - consent jorje/ MD

## 2023-05-09 NOTE — DISCHARGE NOTE PROVIDER - CARE PROVIDER_API CALL
Cisco Shaver)  Cardiology; Internal Medicine  [address] [facility], [address] [place] [address]  Phone: [phone]  Fax: [phone]  Follow Up Time:

## 2023-05-09 NOTE — DISCHARGE NOTE PROVIDER - NSDCFUADDINST_GEN_ALL_CORE_FT
Follow up with Dr. Shaver in 3-4 weeks. Our office will contact you in 3-5 days to schedule this appointment. Please call your doctor with any questions or concerns.

## 2023-05-09 NOTE — DISCHARGE NOTE PROVIDER - NSDCMRMEDTOKEN_GEN_ALL_CORE_FT
Albuterol (Eqv-ProAir HFA) 90 mcg/inh inhalation aerosol: 2 inhaled once a day as needed for as needed  Allergy Relief Multi-Symptom oral tablet: 1 orally once a day  Ambien 5 mg oral tablet: 1 orally once a day (at bedtime) taking 0.5 table (2.5 mg) nightly as needed  doxycycline 20 mg oral tablet: 1 orally once a day  metoprolol succinate 25 mg oral capsule, extended release: 1 orally once a day  Xarelto 20 mg oral tablet: 1 orally once a day   Albuterol (Eqv-ProAir HFA) 90 mcg/inh inhalation aerosol: 2 inhaled once a day as needed for as needed  Allergy Relief Multi-Symptom oral tablet: 1 orally once a day  Ambien 5 mg oral tablet: 1 orally once a day (at bedtime) taking 0.5 table (2.5 mg) nightly as needed  doxycycline 20 mg oral tablet: 1 orally once a day  metoprolol succinate 25 mg oral capsule, extended release: 1 orally once a day  pantoprazole 40 mg oral delayed release tablet: 1 tab(s) orally 2 times a day 1 tab twice a day for 2 weeks then once tablet daily for 6 weeks then STOP  sucralfate 1 g/10 mL oral suspension: 10 milliliter(s) orally 2 times a day  Xarelto 20 mg oral tablet: 1 orally once a day

## 2023-05-09 NOTE — PROGRESS NOTE ADULT - SUBJECTIVE AND OBJECTIVE BOX
PROCEDURE(S): Radiofrequency Ablation of Atrial Fibrillation    ELECTROPHYSIOLOGIST(S): Cisco Shaver MD         COMPLICATIONS:  none        DISPOSITION: observation      CONDITION: stable    Pt doing well s/p elective radiofrequency atrial fibrillation ablation (WACA/PVI, CTI) via b/l FV access. Pt denies complaint post procedure.     MEDICATIONS  (STANDING):  metoprolol succinate ER 25 milliGRAM(s) Oral daily  pantoprazole    Tablet 40 milliGRAM(s) Oral two times a day  rivaroxaban 20 milliGRAM(s) Oral with dinner  sucralfate suspension 1 Gram(s) Oral two times a day    MEDICATIONS  (PRN):  acetaminophen     Tablet .. 650 milliGRAM(s) Oral every 6 hours PRN Mild Pain (1 - 3)  albuterol    90 MICROgram(s) HFA Inhaler 2 Puff(s) Inhalation every 6 hours PRN Shortness of Breath and/or Wheezing  aluminum hydroxide/magnesium hydroxide/simethicone Suspension 30 milliLiter(s) Oral every 4 hours PRN Dyspepsia  benzocaine/menthol Lozenge 1 Lozenge Oral every 2 hours PRN Sore Throat  zolpidem 2.5 milliGRAM(s) Oral at bedtime PRN Insomnia  zolpidem 2.5 milliGRAM(s) Oral at bedtime PRN Insomnia    Allergies:  No Known Allergies    VS:   T(C): 36.4 (05-09-23 @ 09:49), Max: 36.4 (05-09-23 @ 09:49)  HR: 85 (05-09-23 @ 14:15) (69 - 85)  BP: 110/69 (05-09-23 @ 14:15) (110/69 - 144/76)  RR: 16 (05-09-23 @ 14:15) (16 - 17)  SpO2: 96% (05-09-23 @ 14:15) (95% - 96%)  Post-procedure VS: /64 HR 84 O2 sat 98% RR 16     Physical exam:   awake, alert, no obvious distress  Card: S1/S2, RRR, no m/g/r  Resp: lungs CTA b/l  Abd: S/NT/ND  Groins: hemostatic sutures in place; sites C/D/I; no bleeding, hematoma, erythema, exudate or edema  Ext: no edema; distal pulses intact    ORESTES: Deferred 2' strict compliance w/ AC, no thrombus on ICE     ECG: NSR 83 bpm     Assessment:   68 year old male, former smoker, with HTN, rosacea, asthma, and paroxysmal atrial fibrillation. He presented electively and is now status post uncomplicated radiofrequency ablation of atrial fibrillation (WACA/PVI, CTI). Resting comfortably.     Plan:   Admit to telemetry/ONU  Bedrest x 4 hours, then OOB with assistance and progress as tolerated.   Groin sutures to be removed by EP service in AM.   Pending groin status: Xarelto 20mg daily w/ dinner to resume @ 18:00   Start Protonix 40mg BID x 2 weeks then daily X 6 weeks.     Start Carafate 1gm BID x 2 weeks.   Lasix 20mg IV x 1 upon ambulation.   Continue other home medications.   Labs and EKG in AM.   Strict I/Os.  Please encourage incentive spirometry and ambulation once able.  Observation and monitoring on telemetry overnight with anticipated discharge in the AM and outpt follow up in 1 month.

## 2023-05-09 NOTE — DISCHARGE NOTE PROVIDER - NSDCCPTREATMENT_GEN_ALL_CORE_FT
PRINCIPAL PROCEDURE  Procedure: Radiofrequency ablation, arrhythmogenic focus, for atrial fibrillation  Findings and Treatment: - Bruising at the groin, sometimes extending down the leg, and/or a small lump under the skin at the groin access site is normal and will resolve within 2 – 3 weeks.   - Occasional skipped beats or palpitations that last for a few beats are common and generally resolve within 1-2 months.   - You may walk and take stairs at a regular pace.   - Do not perform any exercise more strenuous than walking for 1 week.   - Do not strain or lift heavy objects for 1 week.  - You may shower the day after the procedure.  - Do not soak in water (such as tub baths, hot tubs, swimming, etc.) for 1 week.   - You may resume all other activities the day after the procedure.  Call your doctor if:   - you notice bleeding, redness, drainage, swelling, increased tenderness or a hot sensation around the catheter insertion site.   - your temperature is greater than 100 degrees F for more than 24 hours.  - your rapid heart rhythm returns.  - you have any questions or concerns regarding the procedure.  If significant bleeding and/or a large lump (the size of a golf ball or bigger) occurs:  - Lie flat and apply continuous direct pressure just above the puncture site for at least 10 minutes  - If the issue resolves, notify your physician immediately.    - If the bleeding cannot be controlled, please seek immediate medical attention.  If you experience increased difficulty breathing or chest pain, or if you faint or have dizzy spells, please seek immediate medical attention.

## 2023-05-09 NOTE — DISCHARGE NOTE PROVIDER - NSDCCPCAREPLAN_GEN_ALL_CORE_FT
PRINCIPAL DISCHARGE DIAGNOSIS  Diagnosis: Atrial fibrillation  Assessment and Plan of Treatment:

## 2023-05-10 ENCOUNTER — TRANSCRIPTION ENCOUNTER (OUTPATIENT)
Age: 68
End: 2023-05-10

## 2023-05-10 VITALS
OXYGEN SATURATION: 100 % | SYSTOLIC BLOOD PRESSURE: 105 MMHG | DIASTOLIC BLOOD PRESSURE: 65 MMHG | HEART RATE: 74 BPM | RESPIRATION RATE: 16 BRPM

## 2023-05-10 LAB
ANION GAP SERPL CALC-SCNC: 14 MMOL/L — SIGNIFICANT CHANGE UP (ref 5–17)
BUN SERPL-MCNC: 15.5 MG/DL — SIGNIFICANT CHANGE UP (ref 8–20)
CALCIUM SERPL-MCNC: 8.9 MG/DL — SIGNIFICANT CHANGE UP (ref 8.4–10.5)
CHLORIDE SERPL-SCNC: 102 MMOL/L — SIGNIFICANT CHANGE UP (ref 96–108)
CO2 SERPL-SCNC: 24 MMOL/L — SIGNIFICANT CHANGE UP (ref 22–29)
CREAT SERPL-MCNC: 0.77 MG/DL — SIGNIFICANT CHANGE UP (ref 0.5–1.3)
EGFR: 96 ML/MIN/1.73M2 — SIGNIFICANT CHANGE UP
GLUCOSE SERPL-MCNC: 89 MG/DL — SIGNIFICANT CHANGE UP (ref 70–99)
HCT VFR BLD CALC: 40 % — SIGNIFICANT CHANGE UP (ref 39–50)
HGB BLD-MCNC: 13.6 G/DL — SIGNIFICANT CHANGE UP (ref 13–17)
MAGNESIUM SERPL-MCNC: 2 MG/DL — SIGNIFICANT CHANGE UP (ref 1.6–2.6)
MCHC RBC-ENTMCNC: 31.4 PG — SIGNIFICANT CHANGE UP (ref 27–34)
MCHC RBC-ENTMCNC: 34 GM/DL — SIGNIFICANT CHANGE UP (ref 32–36)
MCV RBC AUTO: 92.4 FL — SIGNIFICANT CHANGE UP (ref 80–100)
PLATELET # BLD AUTO: 280 K/UL — SIGNIFICANT CHANGE UP (ref 150–400)
POTASSIUM SERPL-MCNC: 3.7 MMOL/L — SIGNIFICANT CHANGE UP (ref 3.5–5.3)
POTASSIUM SERPL-SCNC: 3.7 MMOL/L — SIGNIFICANT CHANGE UP (ref 3.5–5.3)
RBC # BLD: 4.33 M/UL — SIGNIFICANT CHANGE UP (ref 4.2–5.8)
RBC # FLD: 13 % — SIGNIFICANT CHANGE UP (ref 10.3–14.5)
SODIUM SERPL-SCNC: 140 MMOL/L — SIGNIFICANT CHANGE UP (ref 135–145)
WBC # BLD: 9.4 K/UL — SIGNIFICANT CHANGE UP (ref 3.8–10.5)
WBC # FLD AUTO: 9.4 K/UL — SIGNIFICANT CHANGE UP (ref 3.8–10.5)

## 2023-05-10 PROCEDURE — 80048 BASIC METABOLIC PNL TOTAL CA: CPT

## 2023-05-10 PROCEDURE — C1759: CPT

## 2023-05-10 PROCEDURE — 93657 TX L/R ATRIAL FIB ADDL: CPT

## 2023-05-10 PROCEDURE — C1731: CPT

## 2023-05-10 PROCEDURE — 85027 COMPLETE CBC AUTOMATED: CPT

## 2023-05-10 PROCEDURE — 93656 COMPRE EP EVAL ABLTJ ATR FIB: CPT

## 2023-05-10 PROCEDURE — C1889: CPT

## 2023-05-10 PROCEDURE — 83735 ASSAY OF MAGNESIUM: CPT

## 2023-05-10 PROCEDURE — 93010 ELECTROCARDIOGRAM REPORT: CPT

## 2023-05-10 PROCEDURE — 36415 COLL VENOUS BLD VENIPUNCTURE: CPT

## 2023-05-10 PROCEDURE — C1732: CPT

## 2023-05-10 PROCEDURE — 93005 ELECTROCARDIOGRAM TRACING: CPT

## 2023-05-10 PROCEDURE — C1894: CPT

## 2023-05-10 PROCEDURE — C1766: CPT

## 2023-05-10 RX ORDER — PANTOPRAZOLE SODIUM 20 MG/1
1 TABLET, DELAYED RELEASE ORAL
Qty: 70 | Refills: 0
Start: 2023-05-10

## 2023-05-10 RX ORDER — SUCRALFATE 1 G
10 TABLET ORAL
Qty: 280 | Refills: 0
Start: 2023-05-10 | End: 2023-05-23

## 2023-05-10 RX ADMIN — PANTOPRAZOLE SODIUM 40 MILLIGRAM(S): 20 TABLET, DELAYED RELEASE ORAL at 05:07

## 2023-05-10 RX ADMIN — Medication 1 GRAM(S): at 05:07

## 2023-05-10 RX ADMIN — Medication 25 MILLIGRAM(S): at 05:07

## 2023-05-10 NOTE — PROGRESS NOTE ADULT - SUBJECTIVE AND OBJECTIVE BOX
Pt doing well POD #1 s/p atrial fibrillation ablation. No overnight events noted, pt denies complaint today. Bilateral groin sutures removed without difficulty, pt tolerated well.       EKG:   TELE: sinus rhythm, no events     PAST MEDICAL & SURGICAL HISTORY:  Afib  History of asthma  Rosacea  HTN (hypertension)  S/P bunionectomy  H/O sinus surgery    MEDICATIONS  (STANDING):  metoprolol succinate ER 25 milliGRAM(s) Oral daily  pantoprazole    Tablet 40 milliGRAM(s) Oral two times a day  rivaroxaban 20 milliGRAM(s) Oral with dinner  sucralfate suspension 1 Gram(s) Oral two times a day    MEDICATIONS  (PRN):  acetaminophen     Tablet .. 650 milliGRAM(s) Oral every 6 hours PRN Mild Pain (1 - 3)  albuterol    90 MICROgram(s) HFA Inhaler 2 Puff(s) Inhalation every 6 hours PRN Shortness of Breath and/or Wheezing  aluminum hydroxide/magnesium hydroxide/simethicone Suspension 30 milliLiter(s) Oral every 4 hours PRN Dyspepsia  benzocaine/menthol Lozenge 1 Lozenge Oral every 2 hours PRN Sore Throat  zolpidem 2.5 milliGRAM(s) Oral at bedtime PRN Insomnia  zolpidem 2.5 milliGRAM(s) Oral at bedtime PRN Insomnia    Allergies:  No Known Allergies    Vital Signs Last 24 Hrs  T(C): 36.4 (10 May 2023 05:00), Max: 36.4 (09 May 2023 09:49)  T(F): 97.6 (10 May 2023 05:00), Max: 97.6 (09 May 2023 09:49)  HR: 71 (10 May 2023 05:00) (69 - 88)  BP: 107/64 (10 May 2023 05:00) (101/67 - 158/83)  RR: 17 (10 May 2023 05:00) (16 - 17)  SpO2: 99% (10 May 2023 05:00) (95% - 100%)    Parameters below as of 10 May 2023 05:00  Patient On (Oxygen Delivery Method): room air    Physical Exam:  Constitutional: NAD, AAOx3  Cardiovascular: +S1S2 RRR  Pulmonary: CTA b/l, unlabored  Abd: soft NTND +BS  Groins: C/D/I bilaterally; no bleeding, hematoma, edema  Extremities: no pedal edema, +distal pulses b/l  Neuro: non focal, GTZ x4    LABS:                        13.6   9.40  )-----------( 280      ( 10 May 2023 05:18 )             40.0     05-10    140  |  102  |  15.5  ----------------------------<  89  3.7   |  24.0  |  0.77    Ca    8.9      10 May 2023 05:18  Mg     2.0     05-10    Assessment:   68 year old male, former smoker, with HTN, rosacea, asthma, and paroxysmal atrial fibrillation. He presented electively and is now POD#1 s/p uncomplicated radiofrequency ablation of atrial fibrillation (WACA/PVI, CTI).    Plan:   Xarelto 20mg daily w/ dinner   Start Protonix 40mg BID x 2 weeks then daily X 6 weeks.     Start Carafate 1gm BID x 2 weeks.   Continue other home medications.  Access site care and activity limitations reviewed w/ pt.   Stable for d/c home.   Outpt f/up in 2-4 weeks.    Pt doing well POD #1 s/p atrial fibrillation ablation. No overnight events noted, pt denies complaint today. Bilateral groin sutures removed without difficulty, pt tolerated well.       EKG: NSR 76 bpm   TELE: sinus rhythm, no events     PAST MEDICAL & SURGICAL HISTORY:  Afib  History of asthma  Rosacea  HTN (hypertension)  S/P bunionectomy  H/O sinus surgery    MEDICATIONS  (STANDING):  metoprolol succinate ER 25 milliGRAM(s) Oral daily  pantoprazole    Tablet 40 milliGRAM(s) Oral two times a day  rivaroxaban 20 milliGRAM(s) Oral with dinner  sucralfate suspension 1 Gram(s) Oral two times a day    MEDICATIONS  (PRN):  acetaminophen     Tablet .. 650 milliGRAM(s) Oral every 6 hours PRN Mild Pain (1 - 3)  albuterol    90 MICROgram(s) HFA Inhaler 2 Puff(s) Inhalation every 6 hours PRN Shortness of Breath and/or Wheezing  aluminum hydroxide/magnesium hydroxide/simethicone Suspension 30 milliLiter(s) Oral every 4 hours PRN Dyspepsia  benzocaine/menthol Lozenge 1 Lozenge Oral every 2 hours PRN Sore Throat  zolpidem 2.5 milliGRAM(s) Oral at bedtime PRN Insomnia  zolpidem 2.5 milliGRAM(s) Oral at bedtime PRN Insomnia    Allergies:  No Known Allergies    Vital Signs Last 24 Hrs  T(C): 36.4 (10 May 2023 05:00), Max: 36.4 (09 May 2023 09:49)  T(F): 97.6 (10 May 2023 05:00), Max: 97.6 (09 May 2023 09:49)  HR: 71 (10 May 2023 05:00) (69 - 88)  BP: 107/64 (10 May 2023 05:00) (101/67 - 158/83)  RR: 17 (10 May 2023 05:00) (16 - 17)  SpO2: 99% (10 May 2023 05:00) (95% - 100%)    Parameters below as of 10 May 2023 05:00  Patient On (Oxygen Delivery Method): room air    Physical Exam:  Constitutional: NAD, AAOx3  Cardiovascular: +S1S2 RRR  Pulmonary: CTA b/l, unlabored  Abd: soft NTND +BS  Groins: C/D/I bilaterally; no bleeding, hematoma, edema  Extremities: no pedal edema, +distal pulses b/l  Neuro: non focal, GTZ x4    LABS:                        13.6   9.40  )-----------( 280      ( 10 May 2023 05:18 )             40.0     05-10    140  |  102  |  15.5  ----------------------------<  89  3.7   |  24.0  |  0.77    Ca    8.9      10 May 2023 05:18  Mg     2.0     05-10    Assessment:   68 year old male, former smoker, with HTN, rosacea, asthma, and paroxysmal atrial fibrillation. He presented electively and is now POD#1 s/p uncomplicated radiofrequency ablation of atrial fibrillation (WACA/PVI, CTI).    Plan:   Xarelto 20mg daily w/ dinner   Start Protonix 40mg BID x 2 weeks then daily X 6 weeks.     Start Carafate 1gm BID x 2 weeks.   Continue other home medications.  Access site care and activity limitations reviewed w/ pt.   Stable for d/c home.   Outpt f/up in 2-4 weeks.

## 2023-05-10 NOTE — DISCHARGE NOTE NURSING/CASE MANAGEMENT/SOCIAL WORK - PATIENT PORTAL LINK FT
You can access the FollowMyHealth Patient Portal offered by [facility] by registering at the following website: [email or web address]. By joining [employer]’s FollowMyHealth portal, you will also be able to view your health information using other applications (apps) compatible with our system.

## 2023-05-31 PROBLEM — I48.91 UNSPECIFIED ATRIAL FIBRILLATION: Chronic | Status: ACTIVE | Noted: 2023-05-02

## 2023-05-31 PROBLEM — L71.9 ROSACEA, UNSPECIFIED: Chronic | Status: ACTIVE | Noted: 2023-05-02

## 2023-05-31 PROBLEM — Z87.09 PERSONAL HISTORY OF OTHER DISEASES OF THE RESPIRATORY SYSTEM: Chronic | Status: ACTIVE | Noted: 2023-05-02

## 2023-05-31 PROBLEM — I10 ESSENTIAL (PRIMARY) HYPERTENSION: Chronic | Status: ACTIVE | Noted: 2023-05-02

## 2023-06-05 DIAGNOSIS — Z87.09 PERSONAL HISTORY OF OTHER DISEASES OF THE RESPIRATORY SYSTEM: ICD-10-CM

## 2023-06-05 DIAGNOSIS — E78.5 HYPERLIPIDEMIA, UNSPECIFIED: ICD-10-CM

## 2023-06-05 RX ORDER — ASPIRIN ENTERIC COATED TABLETS 81 MG 81 MG/1
81 TABLET, DELAYED RELEASE ORAL
Qty: 90 | Refills: 0 | Status: ACTIVE | COMMUNITY
Start: 2023-06-05

## 2023-06-07 ENCOUNTER — APPOINTMENT (OUTPATIENT)
Dept: ELECTROPHYSIOLOGY | Facility: CLINIC | Age: 68
End: 2023-06-07

## 2023-06-23 NOTE — H&P PST ADULT - ANTERIOR CERVICAL R
[FreeTextEntry1] : There's still drainage from the left ear\par Start Augmentin for another 7 days\par Stop ear drops\par Return to office in 2 weeks for reevaluation
[FreeTextEntry1] : There's still drainage from the left ear\par Start Augmentin for another 7 days\par Stop ear drops\par Return to office in 2 weeks for reevaluation
normal

## 2023-07-18 ENCOUNTER — NON-APPOINTMENT (OUTPATIENT)
Age: 68
End: 2023-07-18

## 2023-07-25 ENCOUNTER — APPOINTMENT (OUTPATIENT)
Dept: RADIOLOGY | Facility: CLINIC | Age: 68
End: 2023-07-25
Payer: MEDICARE

## 2023-07-25 PROCEDURE — 72100 X-RAY EXAM L-S SPINE 2/3 VWS: CPT

## 2023-07-25 PROCEDURE — 72070 X-RAY EXAM THORAC SPINE 2VWS: CPT

## 2023-07-25 PROCEDURE — 71046 X-RAY EXAM CHEST 2 VIEWS: CPT

## 2023-11-01 ENCOUNTER — APPOINTMENT (OUTPATIENT)
Dept: ELECTROPHYSIOLOGY | Facility: CLINIC | Age: 68
End: 2023-11-01
Payer: MEDICARE

## 2023-11-01 VITALS
SYSTOLIC BLOOD PRESSURE: 122 MMHG | HEIGHT: 70 IN | BODY MASS INDEX: 24.62 KG/M2 | HEART RATE: 81 BPM | WEIGHT: 172 LBS | DIASTOLIC BLOOD PRESSURE: 76 MMHG

## 2023-11-01 PROCEDURE — 93000 ELECTROCARDIOGRAM COMPLETE: CPT

## 2023-11-01 PROCEDURE — 99215 OFFICE O/P EST HI 40 MIN: CPT | Mod: 25

## 2023-11-15 ENCOUNTER — TRANSCRIPTION ENCOUNTER (OUTPATIENT)
Age: 68
End: 2023-11-15

## 2024-02-11 ENCOUNTER — LABORATORY RESULT (OUTPATIENT)
Age: 69
End: 2024-02-11

## 2024-06-10 NOTE — PHYSICAL EXAM
[Well Developed] : well developed [Well Nourished] : well nourished [No Acute Distress] : no acute distress [Normal Conjunctiva] : normal conjunctiva [Normal S1, S2] : normal S1, S2 [No Murmur] : no murmur [Good Air Entry] : good air entry [Soft] : abdomen soft [Normal Gait] : normal gait [No Edema] : no edema [No Cyanosis] : no cyanosis [No Clubbing] : no clubbing [No Rash] : no rash [Moves all extremities] : moves all extremities [No Focal Deficits] : no focal deficits [Alert and Oriented] : alert and oriented

## 2024-06-12 ENCOUNTER — APPOINTMENT (OUTPATIENT)
Dept: ELECTROPHYSIOLOGY | Facility: CLINIC | Age: 69
End: 2024-06-12
Payer: MEDICARE

## 2024-06-12 PROCEDURE — 99215 OFFICE O/P EST HI 40 MIN: CPT | Mod: 25

## 2024-06-12 PROCEDURE — 93000 ELECTROCARDIOGRAM COMPLETE: CPT | Mod: 59

## 2024-06-12 NOTE — REASON FOR VISIT
[Arrhythmia/ECG Abnorrmalities] : arrhythmia/ECG abnormalities [Spouse] : spouse [FreeTextEntry1] : incomplete note [FreeTextEntry3] : Dr Franklin

## 2024-06-12 NOTE — ADDENDUM
[FreeTextEntry1] : Pt feeling very well with no recurrent AF since his ablation. Will remain off anticoagulation given low risk profile, and continue AF surviellance with Apple Watch. If recurrent AF noted will restart OAC.  He willl continue regular followup with cardiology, and can return for EP as needed.

## 2024-06-12 NOTE — REVIEW OF SYSTEMS
[Negative] : Musculoskeletal [Feeling Fatigued] : not feeling fatigued [SOB] : no shortness of breath [Dyspnea on exertion] : not dyspnea during exertion [Chest Discomfort] : no chest discomfort [Lower Ext Edema] : no extremity edema [Leg Claudication] : no intermittent leg claudication [Palpitations] : no palpitations [Syncope] : no syncope [Rash] : no rash [Dizziness] : no dizziness [Convulsions] : no convulsions [Easy Bleeding] : no tendency for easy bleeding

## 2024-06-12 NOTE — HISTORY OF PRESENT ILLNESS
[FreeTextEntry1] : 69 year old gentleman with history of asthma, former smoker, presenting for followup of paroxysmal atrial fibrillation, s/p AF ablation 5/9/23.  In 2022 he was found to have paroxysmal AF. His Fit bit alerted him to multiple episodes of recurrent AF with rates  bpm, lasting up to an hour or so.   He then wore an event monitor from 12/30/22 to 1/13/23 and had an episode of atrial fibrillation lasting 5.5 hours with rates in AF  bpm (avg 116 bpm), and overall AF burden 2%. Otherwise in sinus rhythm his AVG HR was 72 bpm (range  bpm).    He was started on Xarelto 20mg qd and Toprol 25mg qd. On 5/9/23, he underwent AF ablation, including PVI And CTI ablation.  He has done well since the procedure, he denies any symptoms of palpitation, CP, SOB, dizziness or syncope. After last visit, anticoagulation was stopped, and he has been taking ASA 81mg qd and maintained on Toprol 25mg po daily.   An event monitor performed 9/12- 9/19/23 revealed sinus rhythm (avg 78, range  bpm) and brief episodes of pAT lasting up to 14 beats, with no recurrent AF.  Another event monitor 5/1- 5/8/24 again revealed sinus rhythm (avg 79, range  bpm) with brief runs of pAT up to 8 beats, and no AF.   He also has a new Apple Watch with AF notifications enabled and has not had any known AF.   ECG reveals sinus rhythm 89 bpm with nml intervals, /76

## 2024-06-12 NOTE — CARDIOLOGY SUMMARY
[de-identified] : 6/12/24: SR 89bpm, narrow QRS 11/1/23 sinus rhythm 81 bpm, narrow QRS 1/26/23 sinus rhythm 67 bpm, narrow QRS [de-identified] : ETT 1/24/23 revealed no ischemia or exercise induced arrhythmia. HR increased from 74 to 163 bpm (8METS).   \par  \par  ETT 4/6/2016 negative for ischemia or induced arrhythmia. HR 7-0- 162 bpm with exercise (102% predicted)  [de-identified] : Carotid 7/21 no significant carotid stenosis

## 2024-12-25 PROBLEM — F10.90 ALCOHOL USE: Status: ACTIVE | Noted: 2023-01-25

## 2025-06-04 ENCOUNTER — APPOINTMENT (OUTPATIENT)
Dept: ELECTROPHYSIOLOGY | Facility: CLINIC | Age: 70
End: 2025-06-04